# Patient Record
Sex: FEMALE | Race: OTHER | URBAN - METROPOLITAN AREA
[De-identification: names, ages, dates, MRNs, and addresses within clinical notes are randomized per-mention and may not be internally consistent; named-entity substitution may affect disease eponyms.]

---

## 2023-03-24 ENCOUNTER — OFFICE VISIT (OUTPATIENT)
Dept: FAMILY MEDICINE CLINIC | Age: 28
End: 2023-03-24

## 2023-03-24 VITALS
OXYGEN SATURATION: 100 % | SYSTOLIC BLOOD PRESSURE: 119 MMHG | TEMPERATURE: 98.7 F | HEIGHT: 64 IN | HEART RATE: 62 BPM | WEIGHT: 172 LBS | BODY MASS INDEX: 29.37 KG/M2 | DIASTOLIC BLOOD PRESSURE: 74 MMHG

## 2023-03-24 DIAGNOSIS — N64.4 BREAST PAIN: Primary | ICD-10-CM

## 2023-03-24 PROCEDURE — 99204 OFFICE O/P NEW MOD 45 MIN: CPT | Performed by: NURSE PRACTITIONER

## 2023-03-24 RX ORDER — IBUPROFEN 400 MG/1
400 TABLET ORAL
Qty: 60 TABLET | Refills: 0 | Status: SHIPPED | OUTPATIENT
Start: 2023-03-24

## 2023-03-24 NOTE — PROGRESS NOTES
[facility] Carrier seen at d/c, full name and  verified. After Visit Summary provided and reviewed. Advised patient to schedule follow up appointment for Implanon removal before she leaves today. Per provider orders, advised patient to minimize caffeine and to start taking Vitamin E 400 units daily (over the counter). Medication list and possible side effects reviewed. Teach back method was utilized to ensure patient accurately understands how to and when to take each medication. GoodRx coupon provided and explained how to use. Opportunity for questions provided, patient verbalizes understanding.

## 2023-03-24 NOTE — PROGRESS NOTES
3/24/2023 : Gallo saleem (: 1995) is a 28 y.o. female,  new patient, here for evaluation of the following chief complaint(s):  Implanon Removal (Patient requesting to have birth control implant removed) and Breast pain (Patient c/o hot sensation and pain on both breasts)     ASSESSMENT/PLAN:  Below is the assessment and plan developed based on review of pertinent history, physical exam, labs, studies, and medications. 1. Breast pain  -     ibuprofen (MOTRIN) 400 mg tablet; Take 1 Tablet by mouth every six (6) hours as needed for Pain., Normal, Disp-60 Tablet, R-0  Return for Dr. Silvia Alonso removal.  -return to see Dr. Foster Garcia for implanon removal  -eliminate/limit caffeine  -take Vitamin E 400 units per day    SUBJECTIVE/OBJECTIVE:  HPI Has had breast pain for 2-3 days. Pain comes and goes. Not now. Had this on Saturday. Around her nipple, felt like someone was pulling them,   It hurt all day and part of the night. Never before. Sometimes has breast pain before period but Sat and  it was different. Ibuprofen helped and also Tylenol helped for a moment. She had one for 3 years and this one for 4 years. Her implant is about to . No results found for any visits on 23. Current Medications:   Current Outpatient Medications   Medication Sig    ibuprofen (MOTRIN) 400 mg tablet Take 1 Tablet by mouth every six (6) hours as needed for Pain. Review of Systems: Negative for: fever, chest pain, shortness of breath, leg swelling. Social History:  reports that she has never smoked. She has never used smokeless tobacco. She reports current alcohol use. She reports that she does not use drugs. Physical Examination: No LMP recorded (lmp unknown). Patient has had an implant. Blood pressure 119/74, pulse 62, temperature 98.7 °F (37.1 °C), temperature source Temporal, height 5' 3.7\" (1.618 m), weight 172 lb (78 kg), SpO2 100 %.     General appearance - well developed, no acute distress. Chest - clear to auscultation. Heart - regular rate and rhythm without murmurs, rubs, or gallops. Abdomen - bowel sounds present x 4, NT, ND  Extremities - no CCE. An electronic signature was used to authenticate this note.   -- Rustam Ramirez, NP

## 2023-04-24 ENCOUNTER — OFFICE VISIT (OUTPATIENT)
Dept: FAMILY MEDICINE CLINIC | Facility: CLINIC | Age: 28
End: 2023-04-24

## 2023-04-24 VITALS
TEMPERATURE: 98.4 F | HEIGHT: 64 IN | SYSTOLIC BLOOD PRESSURE: 109 MMHG | HEART RATE: 66 BPM | WEIGHT: 169 LBS | OXYGEN SATURATION: 98 % | BODY MASS INDEX: 28.85 KG/M2 | DIASTOLIC BLOOD PRESSURE: 70 MMHG

## 2023-04-24 DIAGNOSIS — Z30.46 ENCOUNTER FOR NEXPLANON REMOVAL: Primary | ICD-10-CM

## 2023-04-24 PROCEDURE — 11982 REMOVE DRUG IMPLANT DEVICE: CPT | Performed by: FAMILY MEDICINE

## 2023-04-24 PROCEDURE — 99215 OFFICE O/P EST HI 40 MIN: CPT | Performed by: FAMILY MEDICINE

## 2023-04-24 RX ORDER — LEVONORGESTREL AND ETHINYL ESTRADIOL 0.1-0.02MG
1 KIT ORAL DAILY
Qty: 90 TABLET | Refills: 3 | Status: SHIPPED | OUTPATIENT
Start: 2023-04-24 | End: 2024-04-18

## 2023-04-24 NOTE — PROGRESS NOTES
Coordination of Care  1. Have you been to the ER, urgent care clinic since your last visit? Hospitalized since your last visit? No    2. Have you seen or consulted any other health care providers outside of the [address] [place] [address] since your last visit? Include any pap smears or colon screening. No    Does the patient need refills? NO    Learning Assessment Complete?  yes  Depression Screening complete in the past 12 months? yes

## 2023-04-24 NOTE — PATIENT INSTRUCTIONS
Can remove pressure bandage after 12 hours (tomorrow morning)   Can remove steristrips after 3 days, or they'll fall off on their own. Bathe as normal starting tomorrow. Return to clinic for signs of infection.      Meds sent to [facility]     F/GAMALIEL PRN

## 2023-04-24 NOTE — PROGRESS NOTES
Billy Rodas is a 28 y.o. female   Chief Complaint   Patient presents with    Implanon Removal         ASSESSMENT AND PLAN:    1. Encounter for Nexplanon removal  Successful removal of 2 intact contraceptive rods. Care and return precautions reviewed. - REMOVAL DRUG IMPLANT DEVICE  - levonorgestrel-ethinyl estradiol (AVIANE, ALESSE, LESSINA) 0.1-20 mg-mcg tab; Take 1 Tablet by mouth daily for 360 days. Dispense: 90 Tablet; Refill: 3      SUBJECTIVE:    HPI:  Anita Bell is a 28 y.o. female who presents for nexplanon removal. She has the type with 2 rods. She would like to menstruate normally, plus it is due to be removed. Review of Systems   Constitutional:  Negative for fever and malaise/fatigue. Eyes:  Negative for blurred vision. Respiratory:  Negative for cough and shortness of breath. Cardiovascular:  Negative for chest pain, palpitations and leg swelling. Gastrointestinal:  Negative for abdominal pain, constipation, diarrhea, nausea and vomiting. Neurological:  Negative for dizziness and headaches. /70 (BP 1 Location: Left upper arm, BP Patient Position: Sitting)   Pulse 66   Temp 98.4 °F (36.9 °C) (Temporal)   Ht 5' 3.7\" (1.618 m)   Wt 169 lb (76.7 kg)   SpO2 98%   BMI 29.28 kg/m²     Physical Exam  Constitutional:       General: She is not in acute distress. Appearance: Normal appearance. Eyes:      Extraocular Movements: Extraocular movements intact. Conjunctiva/sclera: Conjunctivae normal.      Pupils: Pupils are equal, round, and reactive to light. Cardiovascular:      Rate and Rhythm: Normal rate and regular rhythm. Heart sounds: Normal heart sounds. Pulmonary:      Effort: Pulmonary effort is normal.      Breath sounds: Normal breath sounds. Skin:     Comments: 2 parallel rods palpated in LUE. Neurological:      Mental Status: She is alert.        NEXPLANON PROCEDURE NOTE:    Anesthesia: 1% Lidocaine  6 ml Procedure: Consent obtained. A time-out was performed prior to  initiating procedure to be sure of right patient and right location. The  area surrounding the Nexplanon was prepared with Choloraprep and draped  in the usual sterile manner. The site was anesthetized with 6ccs of 1% lidocaine. A skin incision was made over the distal aspect of the device. The  capsule lysed sharply and the device removed using a hemostat. This was repeated for the second vicky. Hemostasis was assured. The site was dressed with SteriStrips and a pressure dressing. The patient tolerated the procedure  well. Followup: The patient tolerated the procedure well without  complications. Standard post-procedure care is explained and return  precautions are given. Contraception is advised until conception is  desired.

## 2024-02-01 ENCOUNTER — HOSPITAL ENCOUNTER (OUTPATIENT)
Facility: HOSPITAL | Age: 29
Setting detail: SPECIMEN
Discharge: HOME | End: 2024-02-04

## 2024-02-01 ENCOUNTER — OFFICE VISIT (OUTPATIENT)
Age: 29
End: 2024-02-01

## 2024-02-01 VITALS
DIASTOLIC BLOOD PRESSURE: 76 MMHG | OXYGEN SATURATION: 100 % | TEMPERATURE: 97 F | WEIGHT: 172 LBS | SYSTOLIC BLOOD PRESSURE: 113 MMHG | HEIGHT: 64 IN | BODY MASS INDEX: 29.37 KG/M2 | HEART RATE: 75 BPM

## 2024-02-01 DIAGNOSIS — M54.50 ACUTE LEFT-SIDED LOW BACK PAIN WITHOUT SCIATICA: Primary | ICD-10-CM

## 2024-02-01 DIAGNOSIS — M54.50 ACUTE LEFT-SIDED LOW BACK PAIN WITHOUT SCIATICA: ICD-10-CM

## 2024-02-01 PROCEDURE — 99214 OFFICE O/P EST MOD 30 MIN: CPT | Performed by: FAMILY MEDICINE

## 2024-02-01 PROCEDURE — 81001 URINALYSIS AUTO W/SCOPE: CPT

## 2024-02-01 PROCEDURE — 85025 COMPLETE CBC W/AUTO DIFF WBC: CPT

## 2024-02-01 PROCEDURE — 80053 COMPREHEN METABOLIC PANEL: CPT

## 2024-02-01 RX ORDER — NAPROXEN 500 MG/1
500 TABLET ORAL 2 TIMES DAILY WITH MEALS
Qty: 60 TABLET | Refills: 0 | Status: SHIPPED | OUTPATIENT
Start: 2024-02-01

## 2024-02-01 RX ORDER — CYCLOBENZAPRINE HCL 10 MG
TABLET ORAL
Qty: 30 TABLET | Refills: 0 | Status: SHIPPED | OUTPATIENT
Start: 2024-02-01

## 2024-02-01 SDOH — ECONOMIC STABILITY: HOUSING INSECURITY
IN THE LAST 12 MONTHS, WAS THERE A TIME WHEN YOU DID NOT HAVE A STEADY PLACE TO SLEEP OR SLEPT IN A SHELTER (INCLUDING NOW)?: NO

## 2024-02-01 SDOH — ECONOMIC STABILITY: FOOD INSECURITY: WITHIN THE PAST 12 MONTHS, THE FOOD YOU BOUGHT JUST DIDN'T LAST AND YOU DIDN'T HAVE MONEY TO GET MORE.: NEVER TRUE

## 2024-02-01 SDOH — ECONOMIC STABILITY: FOOD INSECURITY: WITHIN THE PAST 12 MONTHS, YOU WORRIED THAT YOUR FOOD WOULD RUN OUT BEFORE YOU GOT MONEY TO BUY MORE.: NEVER TRUE

## 2024-02-01 SDOH — ECONOMIC STABILITY: INCOME INSECURITY: HOW HARD IS IT FOR YOU TO PAY FOR THE VERY BASICS LIKE FOOD, HOUSING, MEDICAL CARE, AND HEATING?: SOMEWHAT HARD

## 2024-02-01 ASSESSMENT — PATIENT HEALTH QUESTIONNAIRE - PHQ9
SUM OF ALL RESPONSES TO PHQ9 QUESTIONS 1 & 2: 0
SUM OF ALL RESPONSES TO PHQ QUESTIONS 1-9: 0
1. LITTLE INTEREST OR PLEASURE IN DOING THINGS: 0
SUM OF ALL RESPONSES TO PHQ QUESTIONS 1-9: 0
2. FEELING DOWN, DEPRESSED OR HOPELESS: 0
SUM OF ALL RESPONSES TO PHQ QUESTIONS 1-9: 0
SUM OF ALL RESPONSES TO PHQ QUESTIONS 1-9: 0

## 2024-02-01 ASSESSMENT — ENCOUNTER SYMPTOMS
NAUSEA: 0
ABDOMINAL PAIN: 0
BACK PAIN: 1
SHORTNESS OF BREATH: 0
COUGH: 0
CONSTIPATION: 0
DIARRHEA: 0

## 2024-02-01 NOTE — PROGRESS NOTES
[facility] services: [id number] .  Pearl Reddy LPN    Patient name and date of birth verified by .  Patient given an after visit summary, reviewed medications on how and when to take, coupons given to present to pharmacy for medication discount.  Advised to schedule next appointment before leaving clinic office.  Patient verbalized understanding of all information given at time of visit. Pearl Reddy LPN    Patient advised that she can use heating pad or ice compress on back, whichever is more comfortable to her.  Patient verbalized understanding of all information given at time of visit.  Pearl Reddy LPN

## 2024-02-01 NOTE — PROGRESS NOTES
Patricia Paris is a 28 y.o. female   Chief Complaint   Patient presents with    Lower Back Pain     X 2 days         ASSESSMENT AND PLAN:    1. Acute left-sided low back pain without sciatica  Pain is left lower back, not flank -- less concern for pyelo or nephrolithiasis. Will check urine and renal function.  Most likely [facility] - start MR and NSAID.  Follow up in 3-4 weeks.  - Urinalysis with Microscopic; Future  - Comprehensive Metabolic Panel; Future  - CBC with Auto Differential; Future  - cyclobenzaprine (FLEXERIL) 10 MG tablet; Take 1 tab PO QHS for 2 weeks, then QHS PRN for back pain.  Dispense: 30 tablet; Refill: 0  - naproxen (NAPROSYN) 500 MG tablet; Take 1 tablet by mouth 2 times daily (with meals)  Dispense: 60 tablet; Refill: 0        SUBJECTIVE:    HPI:  Patricia Paris is a 28 y.o. female   She has been having left sided low back pain that concerns her for her kidney.  She's had it before, but it got acutely worse yesterday.  No dysuria, reports decreased urination (normal hydration), no urgency.  No blood in urine.  No GAMALIEL  It hurts to carry heavy objects (like laundry) or to get up, bend over, twist.  She has taken ibuprofen which helped a little.    Sometimes while standing she feels like her legs might give way, possibly related to the pain.    No N/T in feet, sometimes in hands.    PMH: None  PSH: None  Med: None  All: None  SH: Non smoking, rare alcohol consumption.  FH: Non-contributory.    Review of Systems   Constitutional:  Negative for fatigue, fever and unexpected weight change.   Eyes:  Negative for visual disturbance.   Respiratory:  Negative for cough and shortness of breath.    Cardiovascular:  Negative for chest pain and palpitations.   Gastrointestinal:  Negative for abdominal pain, constipation, diarrhea and nausea.   Genitourinary:  Positive for decreased urine volume. Negative for dysuria, frequency, hematuria and urgency.   Musculoskeletal:  Positive for

## 2024-02-02 LAB
ALBUMIN SERPL-MCNC: 3.9 G/DL (ref 3.5–5)
ALBUMIN/GLOB SERPL: 1.1 (ref 1.1–2.2)
ALP SERPL-CCNC: 57 U/L (ref 45–117)
ALT SERPL-CCNC: 24 U/L (ref 12–78)
ANION GAP SERPL CALC-SCNC: 3 MMOL/L (ref 5–15)
APPEARANCE UR: CLEAR
AST SERPL-CCNC: 11 U/L (ref 15–37)
BACTERIA URNS QL MICRO: NEGATIVE /HPF
BASOPHILS # BLD: 0 K/UL (ref 0–0.1)
BASOPHILS NFR BLD: 0 % (ref 0–1)
BILIRUB SERPL-MCNC: 0.4 MG/DL (ref 0.2–1)
BILIRUB UR QL: NEGATIVE
BUN SERPL-MCNC: 11 MG/DL (ref 6–20)
BUN/CREAT SERPL: 13 (ref 12–20)
CALCIUM SERPL-MCNC: 9 MG/DL (ref 8.5–10.1)
CHLORIDE SERPL-SCNC: 107 MMOL/L (ref 97–108)
CO2 SERPL-SCNC: 27 MMOL/L (ref 21–32)
COLOR UR: ABNORMAL
CREAT SERPL-MCNC: 0.84 MG/DL (ref 0.55–1.02)
DIFFERENTIAL METHOD BLD: NORMAL
EOSINOPHIL # BLD: 0.1 K/UL (ref 0–0.4)
EOSINOPHIL NFR BLD: 2 % (ref 0–7)
EPITH CASTS URNS QL MICRO: ABNORMAL /LPF
ERYTHROCYTE [DISTWIDTH] IN BLOOD BY AUTOMATED COUNT: 12.5 % (ref 11.5–14.5)
GLOBULIN SER CALC-MCNC: 3.7 G/DL (ref 2–4)
GLUCOSE SERPL-MCNC: 96 MG/DL (ref 65–100)
GLUCOSE UR STRIP.AUTO-MCNC: NEGATIVE MG/DL
HCT VFR BLD AUTO: 43.5 % (ref 35–47)
HGB BLD-MCNC: 13.9 G/DL (ref 11.5–16)
HGB UR QL STRIP: ABNORMAL
IMM GRANULOCYTES # BLD AUTO: 0 K/UL (ref 0–0.04)
IMM GRANULOCYTES NFR BLD AUTO: 0 % (ref 0–0.5)
KETONES UR QL STRIP.AUTO: NEGATIVE MG/DL
LEUKOCYTE ESTERASE UR QL STRIP.AUTO: NEGATIVE
LYMPHOCYTES # BLD: 3.4 K/UL (ref 0.8–3.5)
LYMPHOCYTES NFR BLD: 46 % (ref 12–49)
MCH RBC QN AUTO: 30.9 PG (ref 26–34)
MCHC RBC AUTO-ENTMCNC: 32 G/DL (ref 30–36.5)
MCV RBC AUTO: 96.7 FL (ref 80–99)
MONOCYTES # BLD: 0.6 K/UL (ref 0–1)
MONOCYTES NFR BLD: 8 % (ref 5–13)
NEUTS SEG # BLD: 3.2 K/UL (ref 1.8–8)
NEUTS SEG NFR BLD: 44 % (ref 32–75)
NITRITE UR QL STRIP.AUTO: NEGATIVE
NRBC # BLD: 0 K/UL (ref 0–0.01)
NRBC BLD-RTO: 0 PER 100 WBC
PH UR STRIP: 7 (ref 5–8)
PLATELET # BLD AUTO: 354 K/UL (ref 150–400)
PMV BLD AUTO: 11.1 FL (ref 8.9–12.9)
POTASSIUM SERPL-SCNC: 4.4 MMOL/L (ref 3.5–5.1)
PROT SERPL-MCNC: 7.6 G/DL (ref 6.4–8.2)
PROT UR STRIP-MCNC: NEGATIVE MG/DL
RBC # BLD AUTO: 4.5 M/UL (ref 3.8–5.2)
RBC #/AREA URNS HPF: ABNORMAL /HPF (ref 0–5)
SODIUM SERPL-SCNC: 137 MMOL/L (ref 136–145)
SP GR UR REFRACTOMETRY: 1.02 (ref 1–1.03)
UROBILINOGEN UR QL STRIP.AUTO: 0.2 EU/DL (ref 0.2–1)
WBC # BLD AUTO: 7.3 K/UL (ref 3.6–11)
WBC URNS QL MICRO: ABNORMAL /HPF (ref 0–4)

## 2024-02-02 NOTE — RESULT ENCOUNTER NOTE
Please let the pt know that here labs were normal.  Normal kidney function. Normal urine studies - no infection or bleeding.  Normal liver function.  Continue treatment for back pain. Follow up is scheduled for 3/1

## 2024-03-01 ENCOUNTER — OFFICE VISIT (OUTPATIENT)
Age: 29
End: 2024-03-01

## 2024-03-01 ENCOUNTER — HOSPITAL ENCOUNTER (OUTPATIENT)
Facility: HOSPITAL | Age: 29
Setting detail: SPECIMEN
Discharge: HOME | End: 2024-03-04

## 2024-03-01 VITALS
TEMPERATURE: 98.4 F | DIASTOLIC BLOOD PRESSURE: 68 MMHG | HEIGHT: 65 IN | HEART RATE: 81 BPM | SYSTOLIC BLOOD PRESSURE: 111 MMHG | BODY MASS INDEX: 29.26 KG/M2 | WEIGHT: 175.6 LBS | OXYGEN SATURATION: 98 %

## 2024-03-01 DIAGNOSIS — R20.0 NUMBNESS AND TINGLING OF UPPER AND LOWER EXTREMITIES OF BOTH SIDES: ICD-10-CM

## 2024-03-01 DIAGNOSIS — R20.2 NUMBNESS AND TINGLING OF UPPER AND LOWER EXTREMITIES OF BOTH SIDES: ICD-10-CM

## 2024-03-01 DIAGNOSIS — Z30.46 ENCOUNTER FOR SURVEILLANCE OF IMPLANTABLE SUBDERMAL CONTRACEPTIVE: Primary | ICD-10-CM

## 2024-03-01 DIAGNOSIS — M54.50 ACUTE LEFT-SIDED LOW BACK PAIN WITHOUT SCIATICA: ICD-10-CM

## 2024-03-01 PROCEDURE — 36415 COLL VENOUS BLD VENIPUNCTURE: CPT

## 2024-03-01 PROCEDURE — 82746 ASSAY OF FOLIC ACID SERUM: CPT

## 2024-03-01 PROCEDURE — 83036 HEMOGLOBIN GLYCOSYLATED A1C: CPT

## 2024-03-01 PROCEDURE — 84443 ASSAY THYROID STIM HORMONE: CPT

## 2024-03-01 PROCEDURE — 82607 VITAMIN B-12: CPT

## 2024-03-01 PROCEDURE — 82306 VITAMIN D 25 HYDROXY: CPT

## 2024-03-01 PROCEDURE — 99214 OFFICE O/P EST MOD 30 MIN: CPT | Performed by: FAMILY MEDICINE

## 2024-03-01 RX ORDER — MULTIVITAMIN/IRON/FOLIC ACID 18MG-0.4MG
1 TABLET ORAL DAILY
Qty: 90 TABLET | Refills: 3 | Status: SHIPPED | OUTPATIENT
Start: 2024-03-01

## 2024-03-01 SDOH — ECONOMIC STABILITY: FOOD INSECURITY: WITHIN THE PAST 12 MONTHS, YOU WORRIED THAT YOUR FOOD WOULD RUN OUT BEFORE YOU GOT MONEY TO BUY MORE.: SOMETIMES TRUE

## 2024-03-01 SDOH — ECONOMIC STABILITY: FOOD INSECURITY: WITHIN THE PAST 12 MONTHS, THE FOOD YOU BOUGHT JUST DIDN'T LAST AND YOU DIDN'T HAVE MONEY TO GET MORE.: NEVER TRUE

## 2024-03-01 SDOH — ECONOMIC STABILITY: INCOME INSECURITY: HOW HARD IS IT FOR YOU TO PAY FOR THE VERY BASICS LIKE FOOD, HOUSING, MEDICAL CARE, AND HEATING?: NOT VERY HARD

## 2024-03-01 ASSESSMENT — ENCOUNTER SYMPTOMS
ABDOMINAL PAIN: 0
CONSTIPATION: 0
SHORTNESS OF BREATH: 0
COUGH: 0
DIARRHEA: 0
NAUSEA: 0

## 2024-03-01 ASSESSMENT — PATIENT HEALTH QUESTIONNAIRE - PHQ9
1. LITTLE INTEREST OR PLEASURE IN DOING THINGS: 0
2. FEELING DOWN, DEPRESSED OR HOPELESS: 0
SUM OF ALL RESPONSES TO PHQ QUESTIONS 1-9: 0
SUM OF ALL RESPONSES TO PHQ QUESTIONS 1-9: 0
SUM OF ALL RESPONSES TO PHQ9 QUESTIONS 1 & 2: 0
SUM OF ALL RESPONSES TO PHQ QUESTIONS 1-9: 0
SUM OF ALL RESPONSES TO PHQ QUESTIONS 1-9: 0

## 2024-03-01 ASSESSMENT — LIFESTYLE VARIABLES
HOW OFTEN DO YOU HAVE A DRINK CONTAINING ALCOHOL: MONTHLY OR LESS
HOW MANY STANDARD DRINKS CONTAINING ALCOHOL DO YOU HAVE ON A TYPICAL DAY: 5 OR 6

## 2024-03-01 NOTE — PROGRESS NOTES
Chief Complaint   Patient presents with    Lower Back Pain     Follow up. Pt reports medications have helped.     OTHER     Pt requests an appt for nexplanon removal on left arm        [facility] services:  [id number].  Pearl Reddy LPN    Patient name and date of birth verified by .  Patient given an after visit summary, reviewed medication on how and when to take, coupon given to present to pharmacy for medication discount.  Advised to schedule next appointment before leaving clinic office.  Patient verbalized understanding of all information given at time of visit. Pearl Reddy LPN

## 2024-03-01 NOTE — PROGRESS NOTES
\"Have you been to the ER, urgent care clinic since your last visit?  Hospitalized since your last visit?\"    NO    “Have you seen or consulted any other health care providers outside of [facility] since your last visit?”    NO    Chief Complaint   Patient presents with    Lower Back Pain     Follow up. Pt reports medications have helped.     OTHER     Pt requests an appt for nexplanon removal on left arm       /68 (Site: Left Upper Arm, Position: Sitting, Cuff Size: Medium Adult)   Pulse 81   Temp 98.4 °F (36.9 °C) (Temporal)   Ht 1.64 m (5' 4.57\")   Wt 79.7 kg (175 lb 9.6 oz)   SpO2 98%   BMI 29.61 kg/m²

## 2024-03-01 NOTE — PROGRESS NOTES
Patricia Paris is a 28 y.o. female   Chief Complaint   Patient presents with   • Lower Back Pain     Follow up. Pt reports medications have helped.    • OTHER     Pt requests an appt for nexplanon removal on left arm           ASSESSMENT AND PLAN:    1. Encounter for surveillance of implantable subdermal contraceptive  Will return for removal.    2. Acute left-sided low back pain without sciatica  Improved. Continue flexeril and naproxen PRN.    3. Numbness and tingling of upper and lower extremities of both sides  Both hands and feet. Not postural.  Check for systemic cause.  MV sent per pt request.  - Hemoglobin A1C; Future  - TSH; Future  - Vitamin B12 & Folate; Future  - Vitamin D 25 Hydroxy; Future  - Multiple Vitamins-Minerals (CENTRUM ULTRA WOMENS) TABS; Take 1 tablet by mouth daily  Dispense: 90 tablet; Refill: 3        SUBJECTIVE:    HPI:  Patricia Paris is a 28 y.o. female who presents for LBP followup. She was seen 2/1 and given flexeril and naproxen.    The muscle relaxant is helping a lot with her back pain. Greatly improved.    She would like to get the nexplanon removed. She has been on contraception a long time and is interested in having children.    She is having numbness and tingling in her hands and feet. Every day. Shaking out or extending her arms and legs feels better but does not relieve the symptoms.   Notes constipation. Her hair is falling out a lot.  Also some pain behind her right knee.  No prolonged standing.    Review of Systems   Constitutional:  Negative for fatigue, fever and unexpected weight change.   Eyes:  Negative for visual disturbance.   Respiratory:  Negative for cough and shortness of breath.    Cardiovascular:  Negative for chest pain and palpitations.   Gastrointestinal:  Negative for abdominal pain, constipation, diarrhea and nausea.   Musculoskeletal:  Positive for myalgias.   Neurological:  Positive for numbness. Negative for dizziness and

## 2024-03-02 LAB
25(OH)D3 SERPL-MCNC: 18.3 NG/ML (ref 30–100)
EST. AVERAGE GLUCOSE BLD GHB EST-MCNC: 103 MG/DL
FOLATE SERPL-MCNC: ABNORMAL NG/ML (ref 5–21)
HBA1C MFR BLD: 5.2 % (ref 4–5.6)
TSH SERPL DL<=0.05 MIU/L-ACNC: 1.24 UIU/ML (ref 0.36–3.74)
VIT B12 SERPL-MCNC: >2000 PG/ML (ref 193–986)

## 2024-03-05 DIAGNOSIS — E55.9 VITAMIN D DEFICIENCY: Primary | ICD-10-CM

## 2024-03-05 LAB — FOLATE SERPL-MCNC: 9.7 NG/ML

## 2024-03-05 RX ORDER — CHOLECALCIFEROL (VITAMIN D3) 1250 MCG
50000 CAPSULE ORAL WEEKLY
Qty: 12 CAPSULE | Refills: 1 | Status: SHIPPED | OUTPATIENT
Start: 2024-03-05

## 2024-03-05 NOTE — RESULT ENCOUNTER NOTE
Please let the pt know that her Vitamin D levels are low, but otherwise her labs are normal.  Normal blood sugar, normal B12 and folate levels, normal thyroid.    I have sent a vit D supplement to her pharmacy. She should take 1 by mouth once a week for 6 months.

## 2024-04-19 ENCOUNTER — PROCEDURE VISIT (OUTPATIENT)
Age: 29
End: 2024-04-19

## 2024-04-19 VITALS
WEIGHT: 175 LBS | DIASTOLIC BLOOD PRESSURE: 62 MMHG | SYSTOLIC BLOOD PRESSURE: 108 MMHG | OXYGEN SATURATION: 100 % | BODY MASS INDEX: 29.51 KG/M2 | HEART RATE: 80 BPM | TEMPERATURE: 97.9 F

## 2024-04-19 DIAGNOSIS — Z30.46 ENCOUNTER FOR REMOVAL OF SUBDERMAL CONTRACEPTIVE IMPLANT: Primary | ICD-10-CM

## 2024-04-19 RX ORDER — LIDOCAINE HYDROCHLORIDE 10 MG/ML
5 INJECTION, SOLUTION EPIDURAL; INFILTRATION; INTRACAUDAL; PERINEURAL ONCE
Status: DISCONTINUED | OUTPATIENT
Start: 2024-04-19 | End: 2024-04-19

## 2024-04-19 RX ORDER — LIDOCAINE HYDROCHLORIDE 10 MG/ML
5 INJECTION, SOLUTION INFILTRATION; PERINEURAL ONCE
Status: COMPLETED | OUTPATIENT
Start: 2024-04-19 | End: 2024-04-19

## 2024-04-19 RX ADMIN — LIDOCAINE HYDROCHLORIDE 5 ML: 10 INJECTION, SOLUTION INFILTRATION; PERINEURAL at 16:55

## 2024-04-19 SDOH — ECONOMIC STABILITY: FOOD INSECURITY: WITHIN THE PAST 12 MONTHS, YOU WORRIED THAT YOUR FOOD WOULD RUN OUT BEFORE YOU GOT MONEY TO BUY MORE.: NEVER TRUE

## 2024-04-19 SDOH — ECONOMIC STABILITY: FOOD INSECURITY: WITHIN THE PAST 12 MONTHS, THE FOOD YOU BOUGHT JUST DIDN'T LAST AND YOU DIDN'T HAVE MONEY TO GET MORE.: NEVER TRUE

## 2024-04-19 SDOH — ECONOMIC STABILITY: INCOME INSECURITY: HOW HARD IS IT FOR YOU TO PAY FOR THE VERY BASICS LIKE FOOD, HOUSING, MEDICAL CARE, AND HEATING?: NOT HARD AT ALL

## 2024-04-19 ASSESSMENT — ENCOUNTER SYMPTOMS: RESPIRATORY NEGATIVE: 1

## 2024-04-19 ASSESSMENT — PATIENT HEALTH QUESTIONNAIRE - PHQ9
2. FEELING DOWN, DEPRESSED OR HOPELESS: NOT AT ALL
1. LITTLE INTEREST OR PLEASURE IN DOING THINGS: NOT AT ALL
SUM OF ALL RESPONSES TO PHQ QUESTIONS 1-9: 0
SUM OF ALL RESPONSES TO PHQ9 QUESTIONS 1 & 2: 0

## 2024-04-19 NOTE — PROGRESS NOTES
Patricia Paris seen at d/c, full name and  verified, given After visit Summary and reviewed today's visit with patient along with instructions on when it is recommended to come back.    The following provider instructions were reviewed with the patient:  Pt can remove pressure bandage tonight after 9:30.  The steri strips will fall off on their own, or she can peel them off after 24 hours.  Recommend taking ibuprofen or tylenol when she gets home to avoid soreness - she will likely bruise.  Return for signs of infection( redness, increase swelling, purulent drainage, worsening site)  Start prenatal vitamins.    I have reviewed the provider's instructions with the patient, answering all questions to her satisfaction. Patient verbalized understanding.  Mulu Ayala RN

## 2024-04-19 NOTE — PROGRESS NOTES
Patricia Paris is a 28 y.o. female   Chief Complaint   Patient presents with    Implant removal      Here for nexplanon removal          ASSESSMENT AND PLAN:    1. Encounter for removal of subdermal contraceptive implant  Successful removal of nexplanon. Care and return precautions reviewed.  PT desires pregnancy; start PNVs.  - REMOVAL DRUG IMPLANT DEVICE    SUBJECTIVE:    HPI:  Patricia Paris is a 28 y.o. female who presents for nexplanon removal.  She'd like to conceive.    Review of Systems   Constitutional: Negative.    Respiratory: Negative.     Cardiovascular: Negative.    Genitourinary: Negative.          /62 (Site: Left Upper Arm, Position: Sitting)   Pulse 80   Temp 97.9 °F (36.6 °C)   Wt 79.4 kg (175 lb)   SpO2 100%   BMI 29.51 kg/m²     Physical Exam  Constitutional:       General: She is not in acute distress.     Appearance: Normal appearance.   Pulmonary:      Effort: Pulmonary effort is normal.   Skin:     Comments: Nexplanon palpated deep in LUE.   Neurological:      Mental Status: She is alert and oriented to person, place, and time.        NEXPLANON PROCEDURE NOTE:    Anesthesia: 1% Lidocaine  5 ml     Procedure: Consent obtained. A time-out was performed prior to  initiating procedure to be sure of right patient and right location. The  area surrounding the Nexplanon was prepared with Choloraprep and draped  in the usual sterile manner. The site was anesthetized with 5ccs of 1% lidocaine . A skin incision was made over the distal aspect of the device. The  capsule lysed sharply and the device removed using a hemostat.  Hemostasis was assured. The site was dressed with SteriStrips and a pressure dressing. The patient tolerated the procedure  well.     Followup: The patient tolerated the procedure well without  complications. Standard post-procedure care is explained and return  precautions are given.

## 2024-04-19 NOTE — PROGRESS NOTES
Session Code 12401    \"Have you been to the ER, urgent care clinic since your last visit?  Hospitalized since your last visit?\"    NO    “Have you seen or consulted any other health care providers outside of [facility] since your last visit?”    NO        Click Here for Release of Records Request  '